# Patient Record
(demographics unavailable — no encounter records)

---

## 2025-06-18 NOTE — HISTORY OF PRESENT ILLNESS
[FreeTextEntry1] : 59 yo F with PMH breast cancer who presents for discussion of CRC screening.    Patient denies nausea, vomiting, diarrhea, constipation, abdominal pain.  Also denies coffee-ground emesis, hematemesis, hematochezia, melena.  Denies prior travel.  Denies weight loss.  Sister with colon cancer at 51

## 2025-06-18 NOTE — PHYSICAL EXAM
[Alert] : alert [Normal Voice/Communication] : normal voice/communication [Healthy Appearing] : healthy appearing [No Acute Distress] : no acute distress [Sclera] : the sclera and conjunctiva were normal [Hearing Threshold Finger Rub Not [place]] : hearing was normal [Normal Lips/Gums] : the lips and gums were normal [Oropharynx] : the oropharynx was normal [Normal Appearance] : the appearance of the neck was normal [No Neck Mass] : no neck mass was observed [No Respiratory Distress] : no respiratory distress [No Acc Muscle Use] : no accessory muscle use [Respiration, Rhythm And Depth] : normal respiratory rhythm and effort [Auscultation Breath Sounds / Voice Sounds] : lungs were clear to auscultation bilaterally [Heart Rate And Rhythm] : heart rate was normal and rhythm regular [Normal S1, S2] : normal S1 and S2 [Murmurs] : no murmurs [Bowel Sounds] : normal bowel sounds [Abdomen Tenderness] : non-tender [No Masses] : no abdominal mass palpated [Abdomen Soft] : soft [] : no hepatosplenomegaly [Oriented To Time, Place, And Person] : oriented to person, place, and time

## 2025-06-18 NOTE — ASSESSMENT
[FreeTextEntry1] : No labs available. Will get basic labs Discussed risks and benefits of procedure including infection, bleeding, perforation.  Patient is in agreement with plan for procedure.  Preparation sent to pharmacy.  Discussed management of anticoagulation and SGLT2/GLP-1 medications.